# Patient Record
(demographics unavailable — no encounter records)

---

## 2025-03-08 NOTE — HISTORY OF PRESENT ILLNESS
[FreeTextEntry1] : Patient is 26 y/o woman who is here for follow up after recent hospital admission. She initially presented to [facility] on 1/30 with 3 days of headache, malaise, and chills. 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 Patient recently traveled to [facility] for 3 weeks with the family for vacation. She took malaria prophylaxis. [place]ed [place] on 1/10/25. She most recently went to [place], [place] 1/23--1/27 with friends for her birthday celebrations. Patient has seen a dentist about 2 months ago for wisdom tooth issue, but no dental procedures done and no current symptoms. Patient is up to date on all vaccinations including meningococcal.  In the hospital she was found to be febrile, T-max 101.2 F. Leukocytosis, WBC: 14--15k. HIV negative. CTH and CXR unremarkable. CSF analysis with neutrophilic pleocytosis, total cell count:159, 98% PMNs, glucose: 9, and Protein 343. CSF PCR panel positive for GBS. CSF culture with rare Group B strep. Blood cultures Streptococcus agalactiae (Group B Strep). MRI brain unremarkable. TTE was without any mention of vegetation. She was treated with ceftriaxone inpatient and was discharged on amoxicillin to complete the course.  Today, in good health. She has no fever, chills or headaches. She is back to her routine activities. She has seen her PCP as well. She offers no complaints today.

## 2025-03-08 NOTE — HISTORY OF PRESENT ILLNESS
[FreeTextEntry1] : Patient is 24 y/o woman who is here for follow up after recent hospital admission. She initially presented to [facility] on 1/30 with 3 days of headache, malaise, and chills. 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 Patient recently traveled to [facility] for 3 weeks with the family for vacation. She took malaria prophylaxis. [place] 1[place]10/25. She most recently went to [place], [place] 1/23--1/27 with friends for her birthday celebrations. Patient has seen a dentist about 2 months ago for wisdom tooth issue, but no dental procedures done and no current symptoms. Patient is up to date on all vaccinations including meningococcal.  In the hospital she was found to be febrile, T-max 101.2 F. Leukocytosis, WBC: 14--15k. HIV negative. CTH and CXR unremarkable. CSF analysis with neutrophilic pleocytosis, total cell count:159, 98% PMNs, glucose: 9, and Protein 343. CSF PCR panel positive for GBS. CSF culture with rare Group B strep. Blood cultures Streptococcus agalactiae (Group B Strep). MRI brain unremarkable. TTE was without any mention of vegetation. She was treated with ceftriaxone inpatient and was discharged on amoxicillin to complete the course.  Today, in good health. She has no fever, chills or headaches. She is back to her routine activities. She has seen her PCP as well. She offers no complaints today.

## 2025-03-08 NOTE — PHYSICAL EXAM
[TextEntry] : Constitutional: Young woman, non-toxic, no distress HEENT: on room air. No icterus Cardiovascular: normal S1, S2, no murmur, no edema Respiratory: clear BS bilaterally, no wheezes, no rales GI: soft, non-tender, normal bowel sounds : no mendoza, no CVA tenderness Musculoskeletal: no visible deformity Neurologic: awake and alert, no focal findings Skin: no rash, no erythema, no phlebitis Heme/Onc: no lymphadenopathy Psychiatric: appropriate mood

## 2025-03-08 NOTE — DATA REVIEWED
[FreeTextEntry1] : WBC Count: 15.30 K/uL (02-03 @ 08:05) WBC Count: 15.89 K/uL (01-31 @ 05:24) WBC Count: 14.95 K/uL (01-30 @ 09:19)  11.0 15.30 )-----------( 344 ( 03 Feb 2025 08:05 ) 33.7  02-03  139 | 106 | 12 ----------------------------< 97 3.4[L] | 29 | 0.93  Ca 8.4[L] 03 Feb 2025 08:05 Phos 3.0 02-03 Mg 1.9 02-03    Creatinine Trend Creatinine Trend: 0.85<--, 1.10<--  Lactate, Blood: 1.6 mmol/L (01-30-25 @ 17:25)   CSF PCR Panel (01.30.25 @ 16:30) CSF PCR Result: Detected Escherichia coli K1: NotDetec Haemophilus influenzae: NotDetec Listeria monocytogenes: NotDetec Neisseria meningitidis (encapsulated): NotDetec Streptococcus agalactiae: Detected Streptococcus pneumoniae: NotDetec Cytomegalovirus: NotDetec Enterovirus: NotDetec Herpes simplex virus 1: NotDetec Herpes simplex virus 2: NotDetec Human Herpesvirus 6: NotDetec Human parechovirus: NotDetec Varicella zoster virus: NotDetec Cryptococcus neoformans/gattii: NotDetec   Protein, CSF (01.30.25 @ 16:30) Protein, CSF: 343 mg/dL  Glucose, CSF (01.30.25 @ 16:30) Glucose, CSF: 9 mg/dL  Cerebrospinal Fluid Cell Count-1 (01.30.25 @ 16:30) Tube Type: Tube 1 CSF Appearance: Cloudy CSF Comments: total cells count equals 100 wbc CSF Lymphocytes: 2 % CSF Monocytes/Macrophages: 0 % CSF Neutrophils: 98 % Nucleated Red Blood Cells - Spinal Fluid: 0 % RBC Count - Spinal Fluid: 162 /uL CSF Color: Straw Total Nucleated Cell Count, CSF: 159 /uL   MICROBIOLOGY: Culture - Blood (01.31.25 @ 15:45) Specimen Source: .Blood BLOOD Culture Results: No growth at 72 Hours  Culture - Blood (01.31.25 @ 15:03) Specimen Source: .Blood BLOOD Culture Results: No growth at 72 Hours   .Blood BLOOD 01-30-25 Growth in aerobic bottle: Gram Positive Cocci in Pairs and Chains Direct identification is available within approximately 3-5 hours either by Blood Panel Multiplexed PCR or Direct MALDI-TOF. Details: [email or web address] -- Blood Culture PCR - Streptococcus agalactiae (Group B): Detec (01.30.25 @ 17:25) Culture - Blood (01.30.25 @ 17:25) - Streptococcus agalactiae (Group B): Detec - Tetracycline: R >4 - Vancomycin: S 0.5 - Ceftriaxone: S <=0.25 - Clindamycin: R >0.5 - Levofloxacin: S 1 - Penicillin: S <=0.03   .Blood BLOOD 01-30-25 Growth in anaerobic bottle: Gram Positive Cocci in Pairs and Chains Growth in aerobic bottle: Gram Positive Cocci in Pairs and Chains -- Growth in anaerobic bottle: Gram Positive Cocci in Pairs and Chains Growth in aerobic bottle: Gram Positive Cocci in Pairs and Chains   .CSF CSF 01-30-25 Rare Streptococcus agalactiae (Group B) Susceptibility to follow. -- **Please Note**: This is a Corrected Report** polymorphonuclear leukocytes seen Gram positive cocci in pairs seen by cytocentrifuge Previously reported as: polymorphonuclear leukocytes seen Gram Positive Rods seen by cytocentrifuge   HIV Nonreact [01-30-25 @ 17:25]  < from: MR Head w/ IV Cont (02.01.25 @ 14:32) >  ACC: [id number] EXAM: MR BRAIN IC ORDERED BY: JOHANNA TRAVIS  PROCEDURE DATE: 02/01/2025    INTERPRETATION: CLINICAL INDICATION: AMS.  TECHNIQUE: Multi-planar, multi-sequence magnetic resonance imaging of the brain was performed with andwithout intravenous contrast.  CONTRAST: Post-contrast MR images were obtained following infusion of 5 mL of Gadavist  COMPARISON: No prior studies are available for comparison  FINDINGS:  VENTRICLES AND SULCI: Normal. INTRA-AXIAL: No acute intracranial hemorrhage, midline shift or evidence of acute cerebral ischemia. No abnormal enhancement. EXTRA-AXIAL: No mass or collection. VISUALIZED SINUSES: Mild mucosal thickening. VISUALIZED MASTOIDS: Clear. CALVARIUM: Normal. CAROTID FLOW VOIDS: Normal. MISCELLANEOUS: None.  IMPRESSION: NO EVIDENCE OF INTRACRANIAL HEMORRHAGE, ACUTE TERRITORIAL INFARCT OR AREA OF ABNORMAL ENHANCEMENT.  --- End of Report ---

## 2025-03-08 NOTE — DATA REVIEWED
[FreeTextEntry1] : WBC Count: 15.30 K/uL (02-03 @ 08:05) WBC Count: 15.89 K/uL (01-31 @ 05:24) WBC Count: 14.95 K/uL (01-30 @ 09:19)  11.0 15.30 )-----------( 344 ( 03 Feb 2025 08:05 ) 33.7  02-03  139 | 106 | 12 ----------------------------< 97 3.4[L] | 29 | 0.93  Ca 8.4[L] 03 Feb 2025 08:05 Phos 3.0 02-03 Mg 1.9 02-03    Creatinine Trend Creatinine Trend: 0.85<--, 1.10<--  Lactate, Blood: 1.6 mmol/L (01-30-25 @ 17:25)   CSF PCR Panel (01.30.25 @ 16:30) CSF PCR Result: Detected Escherichia coli K1: NotDetec Haemophilus influenzae: NotDetec Listeria monocytogenes: NotDetec Neisseria meningitidis (encapsulated): NotDetec Streptococcus agalactiae: Detected Streptococcus pneumoniae: NotDetec Cytomegalovirus: NotDetec Enterovirus: NotDetec Herpes simplex virus 1: NotDetec Herpes simplex virus 2: NotDetec Human Herpesvirus 6: NotDetec Human parechovirus: NotDetec Varicella zoster virus: NotDetec Cryptococcus neoformans/gattii: NotDetec   Protein, CSF (01.30.25 @ 16:30) Protein, CSF: 343 mg/dL  Glucose, CSF (01.30.25 @ 16:30) Glucose, CSF: 9 mg/dL  Cerebrospinal Fluid Cell Count-1 (01.30.25 @ 16:30) Tube Type: Tube 1 CSF Appearance: Cloudy CSF Comments: total cells count equals 100 wbc CSF Lymphocytes: 2 % CSF Monocytes/Macrophages: 0 % CSF Neutrophils: 98 % Nucleated Red Blood Cells - Spinal Fluid: 0 % RBC Count - Spinal Fluid: 162 /uL CSF Color: Straw Total Nucleated Cell Count, CSF: 159 /uL   MICROBIOLOGY: Culture - Blood (01.31.25 @ 15:45) Specimen Source: .Blood BLOOD Culture Results: No growth at 72 Hours  Culture - Blood (01.31.25 @ 15:03) Specimen Source: .Blood BLOOD Culture Results: No growth at 72 Hours   .Blood BLOOD 01-30-25 Growth in aerobic bottle: Gram Positive Cocci in Pairs and Chains Direct identification is available within approximately 3-5 hours either by Blood Panel Multiplexed PCR or Direct MALDI-TOF. Details: [email or web address] -- Blood Culture PCR - Streptococcus agalactiae (Group B): Detec (01.30.25 @ 17:25) Culture - Blood (01.30.25 @ 17:25) - Streptococcus agalactiae (Group B): Detec - Tetracycline: R >4 - Vancomycin: S 0.5 - Ceftriaxone: S <=0.25 - Clindamycin: R >0.5 - Levofloxacin: S 1 - Penicillin: S <=0.03   .Blood BLOOD 01-30-25 Growth in anaerobic bottle: Gram Positive Cocci in Pairs and Chains Growth in aerobic bottle: Gram Positive Cocci in Pairs and Chains -- Growth in anaerobic bottle: Gram Positive Cocci in Pairs and Chains Growth in aerobic bottle: Gram Positive Cocci in Pairs and Chains   .CSF CSF 01-30-25 Rare Streptococcus agalactiae (Group B) Susceptibility to follow. -- **Please Note**: This is a Corrected Report** polymorphonuclear leukocytes seen Gram positive cocci in pairs seen by cytocentrifuge Previously reported as: polymorphonuclear leukocytes seen Gram Positive Rods seen by cytocentrifuge   HIV Nonreact [01-30-25 @ 17:25]  < from: MR Head w/ IV Cont (02.01.25 @ 14:32) >  ACC: [id number] EXAM: MR BRAIN IC ORDERED BY: JOHANNA TRAVIS  PROCEDURE DATE: 02/01/2025    INTERPRETATION: CLINICAL INDICATION: AMS.  TECHNIQUE: Multi-planar, multi-sequence magnetic resonance imaging of the brain was performed with andwithout intravenous contrast.  CONTRAST: Post-contrast MR images were obtained following infusion of 5 mL of Gadavist  COMPARISON: No prior studies are available for comparison  FINDINGS:  VENTRICLES AND SULCI: Normal. INTRA-AXIAL: No acute intracranial hemorrhage, midline shift or evidence of acute cerebral ischemia. No abnormal enhancement. EXTRA-AXIAL: No mass or collection. VISUALIZED SINUSES: Mild mucosal thickening. VISUALIZED MASTOIDS: Clear. CALVARIUM: Normal. CAROTID FLOW VOIDS: Normal. MISCELLANEOUS: None.  IMPRESSION: NO EVIDENCE OF INTRACRANIAL HEMORRHAGE, ACUTE TERRITORIAL INFARCT OR AREA OF ABNORMAL ENHANCEMENT.  --- End of Report ---

## 2025-03-08 NOTE — ASSESSMENT
[FreeTextEntry1] : She has completed treatment for acute bacterial meningitis (GBS) and Group B strep bacteremia Normal post hospitalization exam No current antibiotics. Continue follow up with PCP. Up to date on immunizations RTC as needed